# Patient Record
Sex: MALE | Race: WHITE | Employment: FULL TIME | URBAN - METROPOLITAN AREA
[De-identification: names, ages, dates, MRNs, and addresses within clinical notes are randomized per-mention and may not be internally consistent; named-entity substitution may affect disease eponyms.]

---

## 2022-01-26 ENCOUNTER — OFFICE VISIT (OUTPATIENT)
Dept: INTERNAL MEDICINE CLINIC | Facility: CLINIC | Age: 62
End: 2022-01-26

## 2022-01-26 VITALS
BODY MASS INDEX: 21.37 KG/M2 | SYSTOLIC BLOOD PRESSURE: 120 MMHG | HEART RATE: 110 BPM | DIASTOLIC BLOOD PRESSURE: 78 MMHG | WEIGHT: 147.63 LBS | OXYGEN SATURATION: 96 % | RESPIRATION RATE: 20 BRPM | HEIGHT: 69.5 IN | TEMPERATURE: 98 F

## 2022-01-26 DIAGNOSIS — I10 PRIMARY HYPERTENSION: Primary | ICD-10-CM

## 2022-01-26 DIAGNOSIS — Z12.2 ENCOUNTER FOR SCREENING FOR MALIGNANT NEOPLASM OF LUNG IN CURRENT SMOKER WITH 30 PACK YEAR HISTORY OR GREATER: ICD-10-CM

## 2022-01-26 DIAGNOSIS — Z12.5 SCREENING FOR MALIGNANT NEOPLASM OF PROSTATE: ICD-10-CM

## 2022-01-26 DIAGNOSIS — F17.200 ENCOUNTER FOR SCREENING FOR MALIGNANT NEOPLASM OF LUNG IN CURRENT SMOKER WITH 30 PACK YEAR HISTORY OR GREATER: ICD-10-CM

## 2022-01-26 DIAGNOSIS — Z00.00 PREVENTATIVE HEALTH CARE: ICD-10-CM

## 2022-01-26 DIAGNOSIS — Z12.11 SCREENING FOR MALIGNANT NEOPLASM OF COLON: ICD-10-CM

## 2022-01-26 PROCEDURE — 3078F DIAST BP <80 MM HG: CPT | Performed by: INTERNAL MEDICINE

## 2022-01-26 PROCEDURE — 3008F BODY MASS INDEX DOCD: CPT | Performed by: INTERNAL MEDICINE

## 2022-01-26 PROCEDURE — 3074F SYST BP LT 130 MM HG: CPT | Performed by: INTERNAL MEDICINE

## 2022-01-26 PROCEDURE — 99203 OFFICE O/P NEW LOW 30 MIN: CPT | Performed by: INTERNAL MEDICINE

## 2022-01-26 RX ORDER — AMLODIPINE BESYLATE 10 MG/1
10 TABLET ORAL DAILY
Qty: 90 TABLET | Refills: 3 | Status: SHIPPED | OUTPATIENT
Start: 2022-01-26

## 2022-01-26 RX ORDER — AMLODIPINE BESYLATE 10 MG/1
10 TABLET ORAL DAILY
Qty: 90 TABLET | Refills: 3 | Status: SHIPPED | OUTPATIENT
Start: 2022-01-26 | End: 2022-01-26

## 2022-01-26 RX ORDER — AMLODIPINE BESYLATE 10 MG/1
10 TABLET ORAL DAILY
COMMUNITY
End: 2022-01-26

## 2022-01-26 NOTE — PATIENT INSTRUCTIONS
- Amlodipine refilled to [address] blood tests done when you have insurance.   You can schedule a lab appointment with our lab (big desk across the stacy) by calling [phone].  - Follow up with Dr. William Calderon for your colonoscopy once you

## 2022-01-26 NOTE — PROGRESS NOTES
Florentino Villegas is a 62year old male. HPI:   Patient presents with:  Establish Care: Previous PCP: Seen in [place] 1 x for high bp (rx was for 1 year and he is has been out of medication x 1 week). Hypertension    Patient presents to establish care.   Hy oriented, well developed, well nourished,in no apparent distress  SKIN: no rashes,no suspicious lesions  HEENT: atraumatic, PERRLA, EOMI, normal lid and conjunctiva  NECK: supple, no jvd, no thyromegaly  LUNGS: clear to auscultation bilaterally, no wheezin issues arise.     Vandana Carrion MD

## 2022-06-06 ENCOUNTER — LABORATORY ENCOUNTER (OUTPATIENT)
Dept: LAB | Age: 62
End: 2022-06-06
Attending: INTERNAL MEDICINE
Payer: COMMERCIAL

## 2022-06-06 DIAGNOSIS — Z12.5 SCREENING FOR MALIGNANT NEOPLASM OF PROSTATE: ICD-10-CM

## 2022-06-06 DIAGNOSIS — I10 PRIMARY HYPERTENSION: ICD-10-CM

## 2022-06-06 DIAGNOSIS — Z00.00 PREVENTATIVE HEALTH CARE: ICD-10-CM

## 2022-06-06 LAB
ALBUMIN SERPL-MCNC: 3.6 G/DL (ref 3.4–5)
ALBUMIN/GLOB SERPL: 1 {RATIO} (ref 1–2)
ALP LIVER SERPL-CCNC: 112 U/L
ALT SERPL-CCNC: 19 U/L
ANION GAP SERPL CALC-SCNC: 6 MMOL/L (ref 0–18)
AST SERPL-CCNC: 17 U/L (ref 15–37)
BASOPHILS # BLD AUTO: 0.12 X10(3) UL (ref 0–0.2)
BASOPHILS NFR BLD AUTO: 1.2 %
BILIRUB SERPL-MCNC: 0.6 MG/DL (ref 0.1–2)
BUN BLD-MCNC: 12 MG/DL (ref 7–18)
CALCIUM BLD-MCNC: 8.6 MG/DL (ref 8.5–10.1)
CHLORIDE SERPL-SCNC: 105 MMOL/L (ref 98–112)
CHOLEST SERPL-MCNC: 155 MG/DL (ref ?–200)
CO2 SERPL-SCNC: 27 MMOL/L (ref 21–32)
COMPLEXED PSA SERPL-MCNC: 2.38 NG/ML (ref ?–4)
CREAT BLD-MCNC: 1.04 MG/DL
EOSINOPHIL # BLD AUTO: 0.35 X10(3) UL (ref 0–0.7)
EOSINOPHIL NFR BLD AUTO: 3.4 %
ERYTHROCYTE [DISTWIDTH] IN BLOOD BY AUTOMATED COUNT: 12.3 %
EST. AVERAGE GLUCOSE BLD GHB EST-MCNC: 103 MG/DL (ref 68–126)
FASTING PATIENT LIPID ANSWER: YES
FASTING STATUS PATIENT QL REPORTED: YES
GLOBULIN PLAS-MCNC: 3.7 G/DL (ref 2.8–4.4)
GLUCOSE BLD-MCNC: 91 MG/DL (ref 70–99)
HBA1C MFR BLD: 5.2 % (ref ?–5.7)
HCT VFR BLD AUTO: 50.8 %
HDLC SERPL-MCNC: 51 MG/DL (ref 40–59)
HGB BLD-MCNC: 17.3 G/DL
IMM GRANULOCYTES # BLD AUTO: 0.05 X10(3) UL (ref 0–1)
IMM GRANULOCYTES NFR BLD: 0.5 %
LDLC SERPL CALC-MCNC: 74 MG/DL (ref ?–100)
LYMPHOCYTES # BLD AUTO: 2.45 X10(3) UL (ref 1–4)
LYMPHOCYTES NFR BLD AUTO: 23.8 %
MCH RBC QN AUTO: 32.9 PG (ref 26–34)
MCHC RBC AUTO-ENTMCNC: 34.1 G/DL (ref 31–37)
MCV RBC AUTO: 96.6 FL
MONOCYTES # BLD AUTO: 0.84 X10(3) UL (ref 0.1–1)
MONOCYTES NFR BLD AUTO: 8.1 %
NEUTROPHILS # BLD AUTO: 6.5 X10 (3) UL (ref 1.5–7.7)
NEUTROPHILS # BLD AUTO: 6.5 X10(3) UL (ref 1.5–7.7)
NEUTROPHILS NFR BLD AUTO: 63 %
NONHDLC SERPL-MCNC: 104 MG/DL (ref ?–130)
OSMOLALITY SERPL CALC.SUM OF ELEC: 285 MOSM/KG (ref 275–295)
PLATELET # BLD AUTO: 433 10(3)UL (ref 150–450)
POTASSIUM SERPL-SCNC: 4 MMOL/L (ref 3.5–5.1)
PROT SERPL-MCNC: 7.3 G/DL (ref 6.4–8.2)
RBC # BLD AUTO: 5.26 X10(6)UL
SODIUM SERPL-SCNC: 138 MMOL/L (ref 136–145)
TRIGL SERPL-MCNC: 175 MG/DL (ref 30–149)
TSI SER-ACNC: 1.19 MIU/ML (ref 0.36–3.74)
VLDLC SERPL CALC-MCNC: 27 MG/DL (ref 0–30)
WBC # BLD AUTO: 10.3 X10(3) UL (ref 4–11)

## 2022-06-06 PROCEDURE — 85025 COMPLETE CBC W/AUTO DIFF WBC: CPT

## 2022-06-06 PROCEDURE — 80053 COMPREHEN METABOLIC PANEL: CPT

## 2022-06-06 PROCEDURE — 84443 ASSAY THYROID STIM HORMONE: CPT

## 2022-06-06 PROCEDURE — 80061 LIPID PANEL: CPT

## 2022-06-06 PROCEDURE — 83036 HEMOGLOBIN GLYCOSYLATED A1C: CPT

## 2022-10-03 PROBLEM — Z12.11 SPECIAL SCREENING FOR MALIGNANT NEOPLASM OF COLON: Status: ACTIVE | Noted: 2022-10-03

## 2022-10-03 PROBLEM — K57.30 DIVERTICULOSIS OF COLON: Status: ACTIVE | Noted: 2022-10-03

## 2022-10-03 PROBLEM — K64.8 INTERNAL HEMORRHOIDS: Status: ACTIVE | Noted: 2022-10-03

## 2023-02-09 ENCOUNTER — HOSPITAL ENCOUNTER (OUTPATIENT)
Age: 63
Discharge: HOME | End: 2023-02-09
Payer: COMMERCIAL

## 2023-02-09 ENCOUNTER — APPOINTMENT (OUTPATIENT)
Dept: GENERAL RADIOLOGY | Age: 63
End: 2023-02-09
Attending: NURSE PRACTITIONER
Payer: COMMERCIAL

## 2023-02-09 VITALS
SYSTOLIC BLOOD PRESSURE: 115 MMHG | TEMPERATURE: 98 F | HEIGHT: 72 IN | OXYGEN SATURATION: 95 % | BODY MASS INDEX: 21.67 KG/M2 | DIASTOLIC BLOOD PRESSURE: 79 MMHG | RESPIRATION RATE: 18 BRPM | HEART RATE: 95 BPM | WEIGHT: 160 LBS

## 2023-02-09 DIAGNOSIS — S20.211A RIB CONTUSION, RIGHT, INITIAL ENCOUNTER: Primary | ICD-10-CM

## 2023-02-09 PROCEDURE — 99204 OFFICE O/P NEW MOD 45 MIN: CPT

## 2023-02-09 PROCEDURE — 99213 OFFICE O/P EST LOW 20 MIN: CPT

## 2023-02-09 PROCEDURE — 71101 X-RAY EXAM UNILAT RIBS/CHEST: CPT | Performed by: NURSE PRACTITIONER

## 2023-02-09 RX ORDER — ORPHENADRINE CITRATE 100 MG/1
100 TABLET, EXTENDED RELEASE ORAL 2 TIMES DAILY
Qty: 20 TABLET | Refills: 0 | Status: SHIPPED | OUTPATIENT
Start: 2023-02-09 | End: 2023-02-16

## 2023-02-09 NOTE — ED INITIAL ASSESSMENT (HPI)
C/o fall from 3 days ago. Pt reports slipping on ice and falling on his cooler. Pt reports right side of ribs hit cooler. Pt reports treating with otc meds and heating pad with some success. Pt denies HEBER and bursing. Pt reports pain when coughing, deep breathing, and sleeping on affected area.

## 2023-02-09 NOTE — DISCHARGE INSTRUCTIONS
Take ibuprofen for pain  Follow-up with your primary care physician in 3 to 4 days if no improvement  Go directly to emergency department for difficulty breathing, severe pain, fever or any other concerns      Deep breathing: To help prevent pneumonia, take 10 deep breaths every hour, even when you wake up during the night. Brace your ribs with your hands or a pillow while you take deep breaths or cough. This will help decrease your pain. Rest:  Rest your ribs to decrease swelling and allow the injury to heal faster. Avoid activities that may cause more pain or damage to your ribs. As your pain decreases, begin movements slowly. Ice:  Ice helps decrease swelling and pain. Ice may also help prevent tissue damage. Use an ice pack or put crushed ice in a plastic bag. Cover it with a towel and place it on your bruised area for 15 to 20 minutes every hour as directed.

## 2023-03-20 ENCOUNTER — OFFICE VISIT (OUTPATIENT)
Dept: INTERNAL MEDICINE CLINIC | Facility: CLINIC | Age: 63
End: 2023-03-20
Payer: COMMERCIAL

## 2023-03-20 VITALS
HEART RATE: 106 BPM | SYSTOLIC BLOOD PRESSURE: 120 MMHG | RESPIRATION RATE: 16 BRPM | TEMPERATURE: 99 F | OXYGEN SATURATION: 95 % | DIASTOLIC BLOOD PRESSURE: 80 MMHG | WEIGHT: 136.38 LBS | BODY MASS INDEX: 18.47 KG/M2 | HEIGHT: 72 IN

## 2023-03-20 DIAGNOSIS — F17.200 CURRENT EVERY DAY SMOKER: ICD-10-CM

## 2023-03-20 DIAGNOSIS — Z00.00 ENCOUNTER FOR PREVENTATIVE ADULT HEALTH CARE EXAMINATION: Primary | ICD-10-CM

## 2023-03-20 DIAGNOSIS — Z23 NEED FOR DIPHTHERIA-TETANUS-PERTUSSIS (TDAP) VACCINE: ICD-10-CM

## 2023-03-20 DIAGNOSIS — F17.200 ENCOUNTER FOR SCREENING FOR MALIGNANT NEOPLASM OF LUNG IN CURRENT SMOKER WITH 30 PACK YEAR HISTORY OR GREATER: ICD-10-CM

## 2023-03-20 DIAGNOSIS — Z12.2 ENCOUNTER FOR SCREENING FOR MALIGNANT NEOPLASM OF LUNG IN CURRENT SMOKER WITH 30 PACK YEAR HISTORY OR GREATER: ICD-10-CM

## 2023-03-20 DIAGNOSIS — Z12.5 SCREENING FOR MALIGNANT NEOPLASM OF PROSTATE: ICD-10-CM

## 2023-03-20 DIAGNOSIS — I10 PRIMARY HYPERTENSION: ICD-10-CM

## 2023-03-20 DIAGNOSIS — Z23 NEED FOR PNEUMOCOCCAL VACCINATION: ICD-10-CM

## 2023-03-20 RX ORDER — AMLODIPINE BESYLATE 10 MG/1
10 TABLET ORAL DAILY
Qty: 90 TABLET | Refills: 3 | Status: SHIPPED | OUTPATIENT
Start: 2023-03-20

## 2023-03-20 NOTE — PATIENT INSTRUCTIONS
- Repeat blood pressure reading was normal.  Will continue amlodipine. Refill sent to [place].  - Tetanus shot, pneumonia shot given today  - You do not need a shingles shot as you did not have chicken pox  - Get fasting blood tests done in June  - Schedule lung cancer screening CT scan. Call central scheduling at [phone] to schedule. - Follow up with Dr. Ferny Knutson (GI/colonoscopy doctor) to discuss if you need another colonoscopy or CT scan:  [address]  [address]  (On Advise Only)  Phone: [phone]  - Follow up with me in 1 year for physical/chronic issues; follow up earlier as needed. It was a pleasure seeing you in the clinic today. Thank you for choosing the [facility] office for your healthcare needs. Please call at [phone] with any questions or concerns.     Cammy Beltran MD

## 2024-07-22 ENCOUNTER — LAB ENCOUNTER (OUTPATIENT)
Dept: LAB | Age: 64
End: 2024-07-22
Attending: INTERNAL MEDICINE
Payer: COMMERCIAL

## 2024-07-22 ENCOUNTER — OFFICE VISIT (OUTPATIENT)
Dept: INTERNAL MEDICINE CLINIC | Facility: CLINIC | Age: 64
End: 2024-07-22
Payer: COMMERCIAL

## 2024-07-22 VITALS
SYSTOLIC BLOOD PRESSURE: 107 MMHG | OXYGEN SATURATION: 95 % | BODY MASS INDEX: 17.81 KG/M2 | WEIGHT: 121.63 LBS | DIASTOLIC BLOOD PRESSURE: 74 MMHG | HEART RATE: 92 BPM | HEIGHT: 69.13 IN | TEMPERATURE: 98 F

## 2024-07-22 DIAGNOSIS — F17.200 CURRENT EVERY DAY SMOKER: ICD-10-CM

## 2024-07-22 DIAGNOSIS — Z12.5 SCREENING FOR MALIGNANT NEOPLASM OF PROSTATE: ICD-10-CM

## 2024-07-22 DIAGNOSIS — Z00.00 ENCOUNTER FOR PREVENTATIVE ADULT HEALTH CARE EXAMINATION: ICD-10-CM

## 2024-07-22 DIAGNOSIS — Z00.00 ENCOUNTER FOR PREVENTATIVE ADULT HEALTH CARE EXAMINATION: Primary | ICD-10-CM

## 2024-07-22 DIAGNOSIS — R45.89 DEPRESSED MOOD: ICD-10-CM

## 2024-07-22 DIAGNOSIS — I10 PRIMARY HYPERTENSION: ICD-10-CM

## 2024-07-22 DIAGNOSIS — Z12.11 SCREENING FOR MALIGNANT NEOPLASM OF COLON: ICD-10-CM

## 2024-07-22 LAB
ALBUMIN SERPL-MCNC: 4.1 G/DL (ref 3.2–4.8)
ALBUMIN/GLOB SERPL: 1.1 {RATIO} (ref 1–2)
ALP LIVER SERPL-CCNC: 115 U/L
ALT SERPL-CCNC: 71 U/L
ANION GAP SERPL CALC-SCNC: 13 MMOL/L (ref 0–18)
AST SERPL-CCNC: 159 U/L (ref ?–34)
BASOPHILS # BLD AUTO: 0.13 X10(3) UL (ref 0–0.2)
BASOPHILS NFR BLD AUTO: 1 %
BILIRUB SERPL-MCNC: 0.8 MG/DL (ref 0.2–1.1)
BUN BLD-MCNC: 21 MG/DL (ref 9–23)
BUN/CREAT SERPL: 28 (ref 10–20)
CALCIUM BLD-MCNC: 9.4 MG/DL (ref 8.7–10.4)
CHLORIDE SERPL-SCNC: 98 MMOL/L (ref 98–112)
CHOLEST SERPL-MCNC: 164 MG/DL (ref ?–200)
CO2 SERPL-SCNC: 25 MMOL/L (ref 21–32)
COMPLEXED PSA SERPL-MCNC: 1.2 NG/ML (ref ?–4)
CREAT BLD-MCNC: 0.75 MG/DL
DEPRECATED RDW RBC AUTO: 44.3 FL (ref 35.1–46.3)
EGFRCR SERPLBLD CKD-EPI 2021: 101 ML/MIN/1.73M2 (ref 60–?)
EOSINOPHIL # BLD AUTO: 0.07 X10(3) UL (ref 0–0.7)
EOSINOPHIL NFR BLD AUTO: 0.5 %
ERYTHROCYTE [DISTWIDTH] IN BLOOD BY AUTOMATED COUNT: 11.9 % (ref 11–15)
EST. AVERAGE GLUCOSE BLD GHB EST-MCNC: 88 MG/DL (ref 68–126)
FASTING PATIENT LIPID ANSWER: NO
FASTING STATUS PATIENT QL REPORTED: NO
GLOBULIN PLAS-MCNC: 3.7 G/DL (ref 2–3.5)
GLUCOSE BLD-MCNC: 99 MG/DL (ref 70–99)
HBA1C MFR BLD: 4.7 % (ref ?–5.7)
HCT VFR BLD AUTO: 47.4 %
HDLC SERPL-MCNC: 88 MG/DL (ref 40–59)
HGB BLD-MCNC: 16.6 G/DL
IMM GRANULOCYTES # BLD AUTO: 0.05 X10(3) UL (ref 0–1)
IMM GRANULOCYTES NFR BLD: 0.4 %
LDLC SERPL CALC-MCNC: 57 MG/DL (ref ?–100)
LYMPHOCYTES # BLD AUTO: 1.21 X10(3) UL (ref 1–4)
LYMPHOCYTES NFR BLD AUTO: 9.4 %
MCH RBC QN AUTO: 35.5 PG (ref 26–34)
MCHC RBC AUTO-ENTMCNC: 35 G/DL (ref 31–37)
MCV RBC AUTO: 101.5 FL
MONOCYTES # BLD AUTO: 0.9 X10(3) UL (ref 0.1–1)
MONOCYTES NFR BLD AUTO: 7 %
NEUTROPHILS # BLD AUTO: 10.54 X10 (3) UL (ref 1.5–7.7)
NEUTROPHILS # BLD AUTO: 10.54 X10(3) UL (ref 1.5–7.7)
NEUTROPHILS NFR BLD AUTO: 81.7 %
NONHDLC SERPL-MCNC: 76 MG/DL (ref ?–130)
OSMOLALITY SERPL CALC.SUM OF ELEC: 285 MOSM/KG (ref 275–295)
PLATELET # BLD AUTO: 343 10(3)UL (ref 150–450)
POTASSIUM SERPL-SCNC: 3.7 MMOL/L (ref 3.5–5.1)
PROT SERPL-MCNC: 7.8 G/DL (ref 5.7–8.2)
RBC # BLD AUTO: 4.67 X10(6)UL
SODIUM SERPL-SCNC: 136 MMOL/L (ref 136–145)
TRIGL SERPL-MCNC: 111 MG/DL (ref 30–149)
VLDLC SERPL CALC-MCNC: 16 MG/DL (ref 0–30)
WBC # BLD AUTO: 12.9 X10(3) UL (ref 4–11)

## 2024-07-22 PROCEDURE — 85025 COMPLETE CBC W/AUTO DIFF WBC: CPT | Performed by: INTERNAL MEDICINE

## 2024-07-22 PROCEDURE — G0103 PSA SCREENING: HCPCS | Performed by: INTERNAL MEDICINE

## 2024-07-22 PROCEDURE — 80053 COMPREHEN METABOLIC PANEL: CPT | Performed by: INTERNAL MEDICINE

## 2024-07-22 PROCEDURE — 80061 LIPID PANEL: CPT | Performed by: INTERNAL MEDICINE

## 2024-07-22 PROCEDURE — 83036 HEMOGLOBIN GLYCOSYLATED A1C: CPT | Performed by: INTERNAL MEDICINE

## 2024-07-22 RX ORDER — BUPROPION HYDROCHLORIDE 150 MG/1
150 TABLET, EXTENDED RELEASE ORAL 2 TIMES DAILY
Qty: 180 TABLET | Refills: 1 | Status: SHIPPED | OUTPATIENT
Start: 2024-07-22

## 2024-07-22 RX ORDER — AMLODIPINE BESYLATE 10 MG/1
10 TABLET ORAL DAILY
Qty: 90 TABLET | Refills: 3 | Status: SHIPPED | OUTPATIENT
Start: 2024-07-22

## 2024-07-22 NOTE — PATIENT INSTRUCTIONS
- Get blood tests done today  - Schedule lung cancer screening CT scan.  Call central scheduling at [phone] to schedule.  The order does not  for 1 year  - Continue amlodipine for blood pressure  - We will start a medication, bupropion.  Take 1 tablet twice daily.  This helps with smoking, alcohol use as well.  - Will refer you to a therapist once you have new job/insurance  - Follow up with me in 3 months (or as soon as you can around then).    It was a pleasure seeing you in the clinic today.  Thank you for choosing the [facility] [place] office for your healthcare needs. Please call at [phone] with any questions or concerns.    Betty Nguyen MD

## 2024-07-22 NOTE — PROGRESS NOTES
Gab Mullins is a 64 year old male.   HPI:     Chief Complaint   Patient presents with    CPX     Patient presents for CPX/wellness examination.  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    Vision: Wears glasses.  Up to date with eye exam - got new glasses.   Dental: Due for cleaning     Acute issues:  Feeling more isolated, depressed.  Lives alone. .  No homicidal/suicidal ideations.    Hit his left ribs again two weeks ago.  Some sharp pain initially - getting better.    Chronic issues:  Hypertension - at goal blood pressure.    Past medical, family, surgical and social history were reviewed as listed in the chart, and are unchanged from previous visit.  REVIEW OF SYSTEMS:   GENERAL/ const: no fevers/chills, no unintentional weight loss  SKIN: denies any unusual skin lesions  EYES:no vision problems  HEENT: denies sinus pain or sinus tenderness  LUNGS: denies shortness of breath   CARDIOVASCULAR: denies chest pain  GI: denies nausea/emesis/ abdominal pain diarrhea constipation  : denies dysuria   MUSCULOSKELETAL: left-sided rib pain   NEURO: denies headaches  PSYCHIATRIC: lonely/depressed  ENDOCRINE: no hot/cold intolerance  ALLERGY: No Known Allergies  PAST HISTORY:     Current Outpatient Medications:     amLODIPine 10 MG Oral Tab, Take 1 tablet (10 mg total) by mouth daily., Disp: 90 tablet, Rfl: 3    buPROPion  MG Oral Tablet 12 Hr, Take 1 tablet (150 mg total) by mouth 2 (two) times daily., Disp: 180 tablet, Rfl: 1  Medical:  has a past medical history of Blurred vision (2010), Decorative tattoo (1984), Frequent urination (?), Sputum production (06/2019), and Wears glasses (2010).  Surgical:  has a past surgical history that includes hand/finger surgery unlisted.  Family: family history includes Alcohol abuse in his mother; Cancer in his paternal grandfather.  Social:  reports that he has been smoking cigarettes. He has a 40 pack-year smoking  history. He has never used smokeless tobacco. He reports current alcohol use. He reports that he does not currently use drugs.  Wt Readings from Last 6 Encounters:   07/22/24 121 lb 9.6 oz (55.2 kg)   03/20/23 136 lb 6.4 oz (61.9 kg)   02/09/23 160 lb (72.6 kg)   09/12/22 150 lb (68 kg)   01/26/22 147 lb 9.6 oz (67 kg)     EXAM:   /74 (BP Location: Left arm, Patient Position: Sitting, Cuff Size: adult)   Pulse 92   Temp 98.1 °F (36.7 °C) (Temporal)   Ht 5' 9.13\" (1.756 m)   Wt 121 lb 9.6 oz (55.2 kg)   SpO2 95%   BMI 17.89 kg/m²   GENERAL: Alert and oriented, well developed, well nourished,in no apparent distress  SKIN: no rashes,no suspicious lesions  HEENT: atraumatic, PERRLA, EOMI, normal lid and conjunctiva, normal external canals and tympanic membranes bilaterally  NECK: supple, no jvd, no thyromegaly, no palpable/tender cervical lymphadenopathy  LUNGS: clear to auscultation bilaterally, no wheezing/rubs  CARDIO: RRR without murmurs.  No clubbing, cyanosis or edema.  GI: soft non tender nondistended no hepatosplenomegaly, bowel sounds throughout  NEURO: CN II-XII intact, 5/5 strength all extremities  MS: Full ROM, no rib tenderness  PSYCH: pleasant, tearful at times  ASSESSMENT AND PLAN:   1.  Encounter for preventative adult health examination  2. Screening for malignant neoplasm of prostate  3. Screening for malignant neoplasm of colon  4. Current every day smoker  Age appropriate health guidance and counseling provided.  Screening labs ordered as below.  Body mass index is 17.89 kg/m².  Had colonoscopy in 2022 but due to anatomy scope did not get to cecum - CT enterography recommended.  Patient would like to discuss with GI again once he has insurance.  Current smoker, motivated to quit.  Will start bupropion (see below).  Lung cancer screening CT ordered.  Some rib pain after fall, this is improving.  No rib tenderness today.  Will monitor.  - Comp Metabolic Panel (14); Future  - CBC With  Differential With Platelet; Future  - Hemoglobin A1C; Future  - Lipid Panel; Future  - PSA Total, Screen; Future  - buPROPion  MG Oral Tablet 12 Hr; Take 1 tablet (150 mg total) by mouth 2 (two) times daily.  Dispense: 180 tablet; Refill: 1  - CT LUNG LD SCREENING(CPT=71271); Future  - Smoking Cessation less than 3 minutes    5. Primary hypertension  At goal blood pressure.  Continue amlodipine 10 mg every day.  Metabolic panel ordered.  - amLODIPine 10 MG Oral Tab; Take 1 tablet (10 mg total) by mouth daily.  Dispense: 90 tablet; Refill: 3  - Comp Metabolic Panel (14); Future    6. Depressed mood  Patient feels isolated, depressed.  Lives alone.  .  Lost his job last week.  No homicidal/suicidal ideations.  Would benefit from therapy/counseling but he will lose his insurance at the end of the month.  Will start on bupropion - may help with his smoking and alcohol use as well.  Will refer to therapy once he has insurance coverage again.  - buPROPion  MG Oral Tablet 12 Hr; Take 1 tablet (150 mg total) by mouth 2 (two) times daily.  Dispense: 180 tablet; Refill: 1    Patient Care Team:  Betty Nguyen MD as PCP - General (Internal Medicine)  The patient indicates understanding of these issues and agrees to the plan.  The patient is asked to return to clinic in 3 months for follow up on chronic issues, or earlier if acute issues arise.  Patient will lose insurance coverage at the end of the month, may need to temporarily delay follow up based on coverage status.    Betty Nguyen MD

## 2024-07-23 DIAGNOSIS — D72.9 NEUTROPHILIA: ICD-10-CM

## 2024-07-23 DIAGNOSIS — R74.8 ELEVATED LIVER ENZYMES: Primary | ICD-10-CM

## 2024-07-29 ENCOUNTER — HOSPITAL ENCOUNTER (OUTPATIENT)
Dept: CT IMAGING | Facility: HOSPITAL | Age: 64
Discharge: HOME | End: 2024-07-29
Attending: INTERNAL MEDICINE
Payer: COMMERCIAL

## 2024-07-29 DIAGNOSIS — F17.200 CURRENT EVERY DAY SMOKER: ICD-10-CM

## 2024-07-29 PROBLEM — I77.810 ASCENDING AORTA DILATATION (HCC): Status: ACTIVE | Noted: 2024-07-29

## 2024-07-29 PROCEDURE — 71271 CT THORAX LUNG CANCER SCR C-: CPT | Performed by: INTERNAL MEDICINE

## 2024-12-04 ENCOUNTER — TELEPHONE (OUTPATIENT)
Dept: INTERNAL MEDICINE CLINIC | Facility: CLINIC | Age: 64
End: 2024-12-04

## 2024-12-09 NOTE — TELEPHONE ENCOUNTER
Unable to locate forms, patient informed  Informed patient that forms can be completed by Dr Nguyen if he can drop them off or fax to our office. Dr Nguyen can complete likely within 1 day, patient aware Dr Nguyen is out of office on Wednesday.

## 2024-12-09 NOTE — TELEPHONE ENCOUNTER
Patient dropped off [employer] Medical Examiner Letter to Clinician, form placed in your inbasket for completion.     Please call patient to  form once ready    LOV  7/22/2024